# Patient Record
Sex: MALE | URBAN - METROPOLITAN AREA
[De-identification: names, ages, dates, MRNs, and addresses within clinical notes are randomized per-mention and may not be internally consistent; named-entity substitution may affect disease eponyms.]

---

## 2024-11-27 ENCOUNTER — OFFICE VISIT (OUTPATIENT)
Dept: URBAN - METROPOLITAN AREA CLINIC 25 | Facility: CLINIC | Age: 29
End: 2024-11-27
Payer: COMMERCIAL

## 2024-11-27 ENCOUNTER — LAB OUTSIDE AN ENCOUNTER (OUTPATIENT)
Dept: URBAN - METROPOLITAN AREA CLINIC 25 | Facility: CLINIC | Age: 29
End: 2024-11-27

## 2024-11-27 ENCOUNTER — DASHBOARD ENCOUNTERS (OUTPATIENT)
Age: 29
End: 2024-11-27

## 2024-11-27 VITALS
DIASTOLIC BLOOD PRESSURE: 71 MMHG | HEIGHT: 66 IN | HEART RATE: 78 BPM | SYSTOLIC BLOOD PRESSURE: 110 MMHG | BODY MASS INDEX: 22.66 KG/M2 | WEIGHT: 141 LBS

## 2024-11-27 DIAGNOSIS — R68.81 EARLY SATIETY: ICD-10-CM

## 2024-11-27 DIAGNOSIS — R63.4 WEIGHT LOSS: ICD-10-CM

## 2024-11-27 DIAGNOSIS — K29.70 HELICOBACTER POSITIVE GASTRITIS: ICD-10-CM

## 2024-11-27 DIAGNOSIS — R10.13 EPIGASTRIC ABDOMINAL PAIN: ICD-10-CM

## 2024-11-27 PROCEDURE — 99204 OFFICE O/P NEW MOD 45 MIN: CPT | Performed by: INTERNAL MEDICINE

## 2024-11-27 NOTE — HPI-TODAY'S VISIT:
This patient was referred by Dr. Abhishek Moody for an evaluation of GI symptoms.  A copy of this will be sent to the referring provider.  He had knee surgery about 2.5 months ago.  He had a positive HP BT a few months ago.  He completed 3 cycles of Abx.  He has recurrent positive HP infeciton.  He was last treated about 2.5 months ago.  He has a poor appetite.  He has lost some weight which he thinks is due to the surgery.  He has a mild epigastic pain.  Over the past few days he has some mild pain when he breathes deeply.  He denies SOB.  He denies GERD/N/V/dysphagia/odynophagia.  He has early satiety.  He denies LGI symptoms.  He denies LGI BLeed or melena.  e is not on PPI therapy.  He was taking oxycodone daily for his knee pain.  He stopped about 2 weeks ago.

## 2024-12-10 ENCOUNTER — OFFICE VISIT (OUTPATIENT)
Dept: URBAN - METROPOLITAN AREA SURGERY CENTER 20 | Facility: SURGERY CENTER | Age: 29
End: 2024-12-10
Payer: COMMERCIAL

## 2024-12-10 DIAGNOSIS — B96.81 BACTERIAL INFECTION DUE TO H. PYLORI: ICD-10-CM

## 2024-12-10 DIAGNOSIS — R63.4 WEIGHT LOSS: ICD-10-CM

## 2024-12-10 DIAGNOSIS — R10.13 EPIGASTRIC ABDOMINAL PAIN: ICD-10-CM

## 2024-12-10 DIAGNOSIS — K29.70 GASTRITIS: ICD-10-CM

## 2024-12-10 DIAGNOSIS — R68.81 EARLY SATIETY: ICD-10-CM

## 2024-12-10 DIAGNOSIS — K31.89 OTHER DISEASES OF STOMACH AND DUODENUM: ICD-10-CM

## 2024-12-10 DIAGNOSIS — K29.60 OTHER GASTRITIS WITHOUT BLEEDING: ICD-10-CM

## 2024-12-10 DIAGNOSIS — B96.81 H PYLORI: ICD-10-CM

## 2024-12-10 PROCEDURE — 00731 ANES UPR GI NDSC PX NOS: CPT | Performed by: NURSE ANESTHETIST, CERTIFIED REGISTERED

## 2024-12-10 PROCEDURE — 43239 EGD BIOPSY SINGLE/MULTIPLE: CPT | Performed by: INTERNAL MEDICINE

## 2024-12-19 ENCOUNTER — TELEPHONE ENCOUNTER (OUTPATIENT)
Dept: URBAN - METROPOLITAN AREA CLINIC 25 | Facility: CLINIC | Age: 29
End: 2024-12-19

## 2024-12-26 ENCOUNTER — TELEPHONE ENCOUNTER (OUTPATIENT)
Dept: URBAN - METROPOLITAN AREA CLINIC 84 | Facility: CLINIC | Age: 29
End: 2024-12-26

## 2024-12-26 RX ORDER — DOXYCYCLINE HYCLATE 100 MG/1
1 CAPSULE CAPSULE, GELATIN COATED ORAL ONCE A DAY
Qty: 10 | Refills: 0 | OUTPATIENT
Start: 2024-12-26 | End: 2025-01-05

## 2024-12-26 RX ORDER — OMEPRAZOLE 40 MG/1
1 CAPSULE 30 MINUTES BEFORE MORNING AND EVENING MEAL CAPSULE, DELAYED RELEASE ORAL TWICE A DAY
Qty: 20 | Refills: 0 | OUTPATIENT
Start: 2024-12-26

## 2024-12-26 RX ORDER — NITAZOXANIDE 500 MG/1
1 TABLET WITH FOOD TABLET, FILM COATED ORAL
Qty: 20 TABLET | Refills: 0 | OUTPATIENT
Start: 2024-12-26 | End: 2025-01-05

## 2024-12-26 RX ORDER — LEVOFLOXACIN 250 MG/1
1 TABLETS TABLET, FILM COATED ORAL ONCE A DAY
Qty: 10 TABLET | Refills: 0 | OUTPATIENT
Start: 2024-12-26 | End: 2025-01-05

## 2024-12-27 ENCOUNTER — TELEPHONE ENCOUNTER (OUTPATIENT)
Dept: URBAN - METROPOLITAN AREA CLINIC 25 | Facility: CLINIC | Age: 29
End: 2024-12-27

## 2024-12-27 RX ORDER — TINIDAZOLE 500 MG/1
1` TABLETS WITH FOOD TABLET ORAL TWICE A DAY
Qty: 20 | Refills: 0 | OUTPATIENT
Start: 2024-12-30 | End: 2025-01-09

## 2025-01-14 ENCOUNTER — TELEPHONE ENCOUNTER (OUTPATIENT)
Dept: URBAN - METROPOLITAN AREA CLINIC 25 | Facility: CLINIC | Age: 30
End: 2025-01-14

## 2025-03-05 ENCOUNTER — TELEPHONE ENCOUNTER (OUTPATIENT)
Dept: URBAN - METROPOLITAN AREA CLINIC 6 | Facility: CLINIC | Age: 30
End: 2025-03-05

## 2025-03-05 RX ORDER — OMEPRAZOLE MAGNESIUM, AMOXICILLIN AND RIFABUTIN 10; 250; 12.5 MG/1; MG/1; MG/1
4 CAPSULES CAPSULE, DELAYED RELEASE ORAL
Qty: 168 | OUTPATIENT
Start: 2025-03-06 | End: 2025-03-20

## 2025-03-12 ENCOUNTER — TELEPHONE ENCOUNTER (OUTPATIENT)
Dept: URBAN - METROPOLITAN AREA CLINIC 25 | Facility: CLINIC | Age: 30
End: 2025-03-12

## 2025-03-14 ENCOUNTER — TELEPHONE ENCOUNTER (OUTPATIENT)
Dept: URBAN - METROPOLITAN AREA CLINIC 25 | Facility: CLINIC | Age: 30
End: 2025-03-14